# Patient Record
Sex: FEMALE | Race: BLACK OR AFRICAN AMERICAN | Employment: FULL TIME | URBAN - METROPOLITAN AREA
[De-identification: names, ages, dates, MRNs, and addresses within clinical notes are randomized per-mention and may not be internally consistent; named-entity substitution may affect disease eponyms.]

---

## 2022-12-05 ENCOUNTER — HOSPITAL ENCOUNTER (INPATIENT)
Age: 62
LOS: 2 days | Discharge: HOME | DRG: 390 | End: 2022-12-07
Attending: EMERGENCY MEDICINE | Admitting: HOSPITALIST
Payer: COMMERCIAL

## 2022-12-05 ENCOUNTER — APPOINTMENT (OUTPATIENT)
Dept: GENERAL RADIOLOGY | Age: 62
DRG: 390 | End: 2022-12-05
Payer: COMMERCIAL

## 2022-12-05 ENCOUNTER — APPOINTMENT (OUTPATIENT)
Dept: CT IMAGING | Age: 62
DRG: 390 | End: 2022-12-05
Payer: COMMERCIAL

## 2022-12-05 DIAGNOSIS — N30.00 ACUTE CYSTITIS WITHOUT HEMATURIA: ICD-10-CM

## 2022-12-05 DIAGNOSIS — R10.11 ABDOMINAL PAIN, RIGHT UPPER QUADRANT: Primary | ICD-10-CM

## 2022-12-05 PROBLEM — K56.7 ILEUS (HCC): Status: ACTIVE | Noted: 2022-12-05

## 2022-12-05 LAB
A/G RATIO: 1.2 (ref 1.1–2.2)
ALBUMIN SERPL-MCNC: 4.7 G/DL (ref 3.4–5)
ALP BLD-CCNC: 90 U/L (ref 40–129)
ALT SERPL-CCNC: <5 U/L (ref 10–40)
ANION GAP SERPL CALCULATED.3IONS-SCNC: 17 MMOL/L (ref 3–16)
AST SERPL-CCNC: 18 U/L (ref 15–37)
BACTERIA: ABNORMAL /HPF
BASOPHILS ABSOLUTE: 0 K/UL (ref 0–0.2)
BASOPHILS RELATIVE PERCENT: 0.4 %
BILIRUB SERPL-MCNC: 0.7 MG/DL (ref 0–1)
BILIRUBIN URINE: NEGATIVE
BLOOD, URINE: NEGATIVE
BUN BLDV-MCNC: 15 MG/DL (ref 7–20)
CALCIUM SERPL-MCNC: 10.7 MG/DL (ref 8.3–10.6)
CHLORIDE BLD-SCNC: 96 MMOL/L (ref 99–110)
CLARITY: ABNORMAL
CO2: 24 MMOL/L (ref 21–32)
COLOR: ABNORMAL
CREAT SERPL-MCNC: 0.7 MG/DL (ref 0.6–1.2)
EOSINOPHILS ABSOLUTE: 0 K/UL (ref 0–0.6)
EOSINOPHILS RELATIVE PERCENT: 0.2 %
EPITHELIAL CELLS, UA: ABNORMAL /HPF (ref 0–5)
GFR SERPL CREATININE-BSD FRML MDRD: >60 ML/MIN/{1.73_M2}
GLUCOSE BLD-MCNC: 134 MG/DL (ref 70–99)
GLUCOSE URINE: NEGATIVE MG/DL
HCT VFR BLD CALC: 45.4 % (ref 36–48)
HEMOGLOBIN: 15.5 G/DL (ref 12–16)
KETONES, URINE: 40 MG/DL
LEUKOCYTE ESTERASE, URINE: ABNORMAL
LIPASE: 13 U/L (ref 13–60)
LYMPHOCYTES ABSOLUTE: 1.1 K/UL (ref 1–5.1)
LYMPHOCYTES RELATIVE PERCENT: 11.5 %
MCH RBC QN AUTO: 27.6 PG (ref 26–34)
MCHC RBC AUTO-ENTMCNC: 34.2 G/DL (ref 31–36)
MCV RBC AUTO: 80.7 FL (ref 80–100)
MICROSCOPIC EXAMINATION: YES
MONOCYTES ABSOLUTE: 0.4 K/UL (ref 0–1.3)
MONOCYTES RELATIVE PERCENT: 4.2 %
NEUTROPHILS ABSOLUTE: 8.2 K/UL (ref 1.7–7.7)
NEUTROPHILS RELATIVE PERCENT: 83.7 %
NITRITE, URINE: NEGATIVE
PDW BLD-RTO: 14.3 % (ref 12.4–15.4)
PH UA: 7 (ref 5–8)
PLATELET # BLD: 364 K/UL (ref 135–450)
PMV BLD AUTO: 8.3 FL (ref 5–10.5)
POTASSIUM SERPL-SCNC: 3.7 MMOL/L (ref 3.5–5.1)
PROTEIN UA: ABNORMAL MG/DL
RAPID INFLUENZA  B AGN: NEGATIVE
RAPID INFLUENZA A AGN: NEGATIVE
RBC # BLD: 5.63 M/UL (ref 4–5.2)
RBC UA: ABNORMAL /HPF (ref 0–4)
SARS-COV-2, NAAT: NOT DETECTED
SODIUM BLD-SCNC: 137 MMOL/L (ref 136–145)
SPECIFIC GRAVITY UA: 1.02 (ref 1–1.03)
TOTAL PROTEIN: 8.6 G/DL (ref 6.4–8.2)
TROPONIN: <0.01 NG/ML
URINE REFLEX TO CULTURE: YES
URINE TYPE: ABNORMAL
UROBILINOGEN, URINE: 0.2 E.U./DL
WBC # BLD: 9.9 K/UL (ref 4–11)
WBC UA: ABNORMAL /HPF (ref 0–5)

## 2022-12-05 PROCEDURE — 2580000003 HC RX 258: Performed by: EMERGENCY MEDICINE

## 2022-12-05 PROCEDURE — 74177 CT ABD & PELVIS W/CONTRAST: CPT

## 2022-12-05 PROCEDURE — 85025 COMPLETE CBC W/AUTO DIFF WBC: CPT

## 2022-12-05 PROCEDURE — 84484 ASSAY OF TROPONIN QUANT: CPT

## 2022-12-05 PROCEDURE — 36415 COLL VENOUS BLD VENIPUNCTURE: CPT

## 2022-12-05 PROCEDURE — 6360000002 HC RX W HCPCS: Performed by: EMERGENCY MEDICINE

## 2022-12-05 PROCEDURE — 6360000004 HC RX CONTRAST MEDICATION: Performed by: EMERGENCY MEDICINE

## 2022-12-05 PROCEDURE — 80053 COMPREHEN METABOLIC PANEL: CPT

## 2022-12-05 PROCEDURE — 87086 URINE CULTURE/COLONY COUNT: CPT

## 2022-12-05 PROCEDURE — 87804 INFLUENZA ASSAY W/OPTIC: CPT

## 2022-12-05 PROCEDURE — 6360000002 HC RX W HCPCS: Performed by: HOSPITALIST

## 2022-12-05 PROCEDURE — 81001 URINALYSIS AUTO W/SCOPE: CPT

## 2022-12-05 PROCEDURE — 1200000000 HC SEMI PRIVATE

## 2022-12-05 PROCEDURE — 96375 TX/PRO/DX INJ NEW DRUG ADDON: CPT

## 2022-12-05 PROCEDURE — 87635 SARS-COV-2 COVID-19 AMP PRB: CPT

## 2022-12-05 PROCEDURE — 83690 ASSAY OF LIPASE: CPT

## 2022-12-05 PROCEDURE — 2580000003 HC RX 258: Performed by: HOSPITALIST

## 2022-12-05 PROCEDURE — 99285 EMERGENCY DEPT VISIT HI MDM: CPT

## 2022-12-05 PROCEDURE — 96374 THER/PROPH/DIAG INJ IV PUSH: CPT

## 2022-12-05 RX ORDER — SODIUM CHLORIDE 9 MG/ML
INJECTION, SOLUTION INTRAVENOUS CONTINUOUS
Status: DISCONTINUED | OUTPATIENT
Start: 2022-12-05 | End: 2022-12-07 | Stop reason: HOSPADM

## 2022-12-05 RX ORDER — ONDANSETRON 4 MG/1
4 TABLET, ORALLY DISINTEGRATING ORAL EVERY 8 HOURS PRN
Status: DISCONTINUED | OUTPATIENT
Start: 2022-12-05 | End: 2022-12-07 | Stop reason: HOSPADM

## 2022-12-05 RX ORDER — MORPHINE SULFATE 4 MG/ML
4 INJECTION, SOLUTION INTRAMUSCULAR; INTRAVENOUS ONCE
Status: COMPLETED | OUTPATIENT
Start: 2022-12-05 | End: 2022-12-05

## 2022-12-05 RX ORDER — ACETAMINOPHEN 325 MG/1
650 TABLET ORAL EVERY 6 HOURS PRN
Status: DISCONTINUED | OUTPATIENT
Start: 2022-12-05 | End: 2022-12-07 | Stop reason: HOSPADM

## 2022-12-05 RX ORDER — POLYETHYLENE GLYCOL 3350 17 G/17G
17 POWDER, FOR SOLUTION ORAL DAILY PRN
Status: DISCONTINUED | OUTPATIENT
Start: 2022-12-05 | End: 2022-12-07 | Stop reason: HOSPADM

## 2022-12-05 RX ORDER — SODIUM CHLORIDE 0.9 % (FLUSH) 0.9 %
10 SYRINGE (ML) INJECTION PRN
Status: DISCONTINUED | OUTPATIENT
Start: 2022-12-05 | End: 2022-12-07 | Stop reason: HOSPADM

## 2022-12-05 RX ORDER — ONDANSETRON 2 MG/ML
4 INJECTION INTRAMUSCULAR; INTRAVENOUS EVERY 6 HOURS PRN
Status: DISCONTINUED | OUTPATIENT
Start: 2022-12-05 | End: 2022-12-07 | Stop reason: HOSPADM

## 2022-12-05 RX ORDER — ENOXAPARIN SODIUM 100 MG/ML
40 INJECTION SUBCUTANEOUS DAILY
Status: DISCONTINUED | OUTPATIENT
Start: 2022-12-06 | End: 2022-12-07 | Stop reason: HOSPADM

## 2022-12-05 RX ORDER — 0.9 % SODIUM CHLORIDE 0.9 %
1000 INTRAVENOUS SOLUTION INTRAVENOUS ONCE
Status: COMPLETED | OUTPATIENT
Start: 2022-12-05 | End: 2022-12-05

## 2022-12-05 RX ORDER — SODIUM CHLORIDE 9 MG/ML
INJECTION, SOLUTION INTRAVENOUS PRN
Status: DISCONTINUED | OUTPATIENT
Start: 2022-12-05 | End: 2022-12-07 | Stop reason: HOSPADM

## 2022-12-05 RX ORDER — SODIUM CHLORIDE 0.9 % (FLUSH) 0.9 %
5-40 SYRINGE (ML) INJECTION EVERY 12 HOURS SCHEDULED
Status: DISCONTINUED | OUTPATIENT
Start: 2022-12-05 | End: 2022-12-07 | Stop reason: HOSPADM

## 2022-12-05 RX ORDER — POTASSIUM CHLORIDE 20 MEQ/1
40 TABLET, EXTENDED RELEASE ORAL PRN
Status: DISCONTINUED | OUTPATIENT
Start: 2022-12-05 | End: 2022-12-07 | Stop reason: HOSPADM

## 2022-12-05 RX ORDER — ACETAMINOPHEN 650 MG/1
650 SUPPOSITORY RECTAL EVERY 6 HOURS PRN
Status: DISCONTINUED | OUTPATIENT
Start: 2022-12-05 | End: 2022-12-07 | Stop reason: HOSPADM

## 2022-12-05 RX ORDER — ONDANSETRON 2 MG/ML
4 INJECTION INTRAMUSCULAR; INTRAVENOUS ONCE
Status: COMPLETED | OUTPATIENT
Start: 2022-12-05 | End: 2022-12-05

## 2022-12-05 RX ORDER — POTASSIUM CHLORIDE 7.45 MG/ML
10 INJECTION INTRAVENOUS PRN
Status: DISCONTINUED | OUTPATIENT
Start: 2022-12-05 | End: 2022-12-07 | Stop reason: HOSPADM

## 2022-12-05 RX ADMIN — MORPHINE SULFATE 4 MG: 4 INJECTION, SOLUTION INTRAMUSCULAR; INTRAVENOUS at 15:38

## 2022-12-05 RX ADMIN — IOPAMIDOL 100 ML: 755 INJECTION, SOLUTION INTRAVENOUS at 16:36

## 2022-12-05 RX ADMIN — Medication 10 ML: at 23:32

## 2022-12-05 RX ADMIN — PIPERACILLIN AND TAZOBACTAM 4500 MG: 4; .5 INJECTION, POWDER, FOR SOLUTION INTRAVENOUS at 23:34

## 2022-12-05 RX ADMIN — SODIUM CHLORIDE: 9 INJECTION, SOLUTION INTRAVENOUS at 23:33

## 2022-12-05 RX ADMIN — SODIUM CHLORIDE 1000 ML: 9 INJECTION, SOLUTION INTRAVENOUS at 15:38

## 2022-12-05 RX ADMIN — CEFTRIAXONE 1000 MG: 1 INJECTION, POWDER, FOR SOLUTION INTRAMUSCULAR; INTRAVENOUS at 19:07

## 2022-12-05 RX ADMIN — ONDANSETRON 4 MG: 2 INJECTION INTRAMUSCULAR; INTRAVENOUS at 15:38

## 2022-12-05 ASSESSMENT — ENCOUNTER SYMPTOMS
STRIDOR: 0
SHORTNESS OF BREATH: 0
COLOR CHANGE: 0
FACIAL SWELLING: 0
NAUSEA: 1
VOMITING: 1
TROUBLE SWALLOWING: 0
ABDOMINAL PAIN: 1
VOICE CHANGE: 0
WHEEZING: 0

## 2022-12-05 ASSESSMENT — PAIN SCALES - GENERAL
PAINLEVEL_OUTOF10: 1
PAINLEVEL_OUTOF10: 8

## 2022-12-05 ASSESSMENT — PAIN DESCRIPTION - PAIN TYPE: TYPE: ACUTE PAIN

## 2022-12-05 ASSESSMENT — PAIN - FUNCTIONAL ASSESSMENT
PAIN_FUNCTIONAL_ASSESSMENT: ACTIVITIES ARE NOT PREVENTED
PAIN_FUNCTIONAL_ASSESSMENT: 0-10

## 2022-12-05 ASSESSMENT — PAIN DESCRIPTION - ORIENTATION: ORIENTATION: ANTERIOR

## 2022-12-05 ASSESSMENT — PAIN DESCRIPTION - LOCATION: LOCATION: ABDOMEN

## 2022-12-05 ASSESSMENT — PAIN DESCRIPTION - FREQUENCY: FREQUENCY: INTERMITTENT

## 2022-12-05 ASSESSMENT — PAIN DESCRIPTION - DESCRIPTORS: DESCRIPTORS: DISCOMFORT;SHARP

## 2022-12-05 NOTE — LETTER
[address]  Phone: [phone]             December 7, 2022    Patient: Wilfrido Ferro   YOB: 1960   Date of Visit: 12/5/2022       To Whom It May Concern:    Wilfrido Ferro was seen and treated in our facility  beginning 12/5/2022 until 12/7/22. She may return to work on Monday 12/12/22.       Sincerely,       [facility]         Signature:__________________________________

## 2022-12-05 NOTE — LETTER
[address]  Phone: [phone]             December 7, 2022    Patient: Wilfrido Ferro   YOB: 1960   Date of Visit: 12/5/2022       To Whom It May Concern:    Wilfrido Ferro was seen and treated in our facility  beginning 12/5/2022 until 12/7/22. Patient's son, Fiordaliza Rodriguez was with the patient during this time and shall return to work on 12/8/22.       Sincerely,       Sandy Bence, RN         Signature:__________________________________

## 2022-12-05 NOTE — ED PROVIDER NOTES
[id number] [facility]  eMERGENCY dEPARTMENT eNCOUnter      Pt Name: Domitila Robertson  MRN: [id number]  [place] 1960  Date of evaluation: 12/5/2022  Provider: Mamie Perez MD    [address] [place] [address]       Chief Complaint   Patient presents with    Abdominal Pain     Burning in stomach since yesterday, with vomiting. HISTORY OF PRESENT ILLNESS   (Location/Symptom, Timing/Onset, Context/Setting, Quality, Duration, Modifying Factors, Severity)  Note limiting factors. Domitila Robertson is a 63 y.o. female who reports 1 day of epigastric abdominal pain nausea and vomiting. Patient has any fever diarrhea chest pain confusion altered mental status. Patient with symptoms are moderate constant and worsening. Patient denies any known aggravating or alleviating factors. HPI    Nursing Notes were reviewed. REVIEW OFSYSTEMS    (2-9 systems for level 4, 10 or more for level 5)     Review of Systems   Constitutional:  Negative for appetite change, fever and unexpected weight change. HENT:  Negative for facial swelling, trouble swallowing and voice change. Eyes:  Negative for visual disturbance. Respiratory:  Negative for shortness of breath, wheezing and stridor. Cardiovascular:  Negative for chest pain and palpitations. Gastrointestinal:  Positive for abdominal pain, nausea and vomiting. Genitourinary:  Negative for dysuria and vaginal bleeding. Musculoskeletal:  Negative for neck pain and neck stiffness. Skin:  Negative for color change and wound. Neurological:  Negative for seizures and syncope. Psychiatric/Behavioral:  Negative for self-injury and suicidal ideas. Except as noted above the remainder of the review of systems was reviewed and negative. PAST MEDICAL HISTORY   History reviewed. No pertinent past medical history. SURGICAL HISTORY     History reviewed. No pertinent surgical history.       CURRENT MEDICATIONS       Previous Medications    No this note:    CT ABDOMEN PELVIS W IV CONTRAST Additional Contrast? None   Preliminary Result   1. Dilation of small bowel loops in the abdomen and pelvis possibly related   to ileus or early bowel obstruction. 2. Gallbladder has an abnormal appearance with wall calcification and   increased density in the lumen. Further evaluation with gallbladder   ultrasound would be helpful. 3. Uterine fibroids as described. RECOMMENDATIONS:   Right upper quadrant gallbladder ultrasound. Surgical consult for small-bowel obstruction or ileus. ED BEDSIDE ULTRASOUND:   Performed by ED Physician - none    LABS:  Labs Reviewed   CBC WITH AUTO DIFFERENTIAL - Abnormal; Notable for the following components:       Result Value    RBC 5.63 (*)     Neutrophils Absolute 8.2 (*)     All other components within normal limits   COMPREHENSIVE METABOLIC PANEL - Abnormal; Notable for the following components:    Chloride 96 (*)     Anion Gap 17 (*)     Glucose 134 (*)     Calcium 10.7 (*)     Total Protein 8.6 (*)     ALT <5 (*)     All other components within normal limits   URINALYSIS WITH REFLEX TO CULTURE - Abnormal; Notable for the following components:    Color, UA DARK YELLOW (*)     Clarity, UA CLOUDY (*)     Ketones, Urine 40 (*)     Protein, UA TRACE (*)     Leukocyte Esterase, Urine SMALL (*)     All other components within normal limits   MICROSCOPIC URINALYSIS - Abnormal; Notable for the following components:    WBC, UA 10-20 (*)     Epithelial Cells, UA 11-20 (*)     Bacteria, UA 3+ (*)     All other components within normal limits   RAPID INFLUENZA A/B ANTIGENS   COVID-19, RAPID   CULTURE, URINE   LIPASE   TROPONIN       All otherlabs were within normal range or not returned as of this dictation.     EMERGENCY DEPARTMENT COURSE and DIFFERENTIAL DIAGNOSIS/MDM:   Vitals:    Vitals:    12/05/22 1434 12/05/22 1830   BP: (!) 158/93 132/76   Pulse: 87 86   Resp: 18 18   Temp: 98.3 °F (36.8 °C)    TempSrc: Oral SpO2: 98% 98%   Weight: 161 lb 6 oz (73.2 kg)          Is this patient to be included in the SEP-1 Core Measure due to severe sepsis or septic shock? No   Exclusion criteria - the patient is NOT to be included for SEP-1 Core Measure due to:  2+ SIRS criteria are not met      MDM  Number of Diagnoses or Management Options  Work-up is concerning for ileus versus possible early small bowel obstruction with the CT as well as gallbladder showing abnormal appearance with calcification and gallbladder ultrasound recommended. Patient also has bacteria in the urine history of ceftriaxone. I have spoken to the patient and her son and they are in agreement with admission to [facility] for likely right upper quadrant gallbladder ultrasound and GI or surgical consultation. Patient and son expressed understanding and agreement's plan. Patient does require IV pain medication but report substantial improvement in symptoms with IV pain medication was given. Patient admitted for further care. Procedures    FINAL IMPRESSION      1. Abdominal pain, right upper quadrant    2. Acute cystitis without hematuria          DISPOSITION/PLAN   DISPOSITION Admitted 12/05/2022 06:39:39 PM      (Please note that portions of this note were completed with a voice recognition program.  Efforts were made to edit the dictations but occasionally words aremis-transcribed. )    Bernadette Rascon MD (electronically signed)  Attending Emergency Physician           Bernadette Rascon MD  12/05/22 5918

## 2022-12-05 NOTE — LETTER
[address]  Phone: [phone]             December 7, 2022    Patient: Wilfrido Ferro   YOB: 1960   Date of Visit: 12/5/2022       To Whom It May Concern:    Wilfrido Ferro was seen and treated in our facility  beginning 12/5/2022 until 12/7/22. Her son, Mary Jane Mccray was with her during this time and will return to work 12/8/22.       Sincerely,       [facility] ([facility])         Signature:__________________________________

## 2022-12-06 ENCOUNTER — APPOINTMENT (OUTPATIENT)
Dept: ULTRASOUND IMAGING | Age: 62
DRG: 390 | End: 2022-12-06
Payer: COMMERCIAL

## 2022-12-06 LAB
ANION GAP SERPL CALCULATED.3IONS-SCNC: 10 MMOL/L (ref 3–16)
BASOPHILS ABSOLUTE: 0 K/UL (ref 0–0.2)
BASOPHILS RELATIVE PERCENT: 0.7 %
BUN BLDV-MCNC: 11 MG/DL (ref 7–20)
CALCIUM SERPL-MCNC: 8.4 MG/DL (ref 8.3–10.6)
CHLORIDE BLD-SCNC: 106 MMOL/L (ref 99–110)
CO2: 25 MMOL/L (ref 21–32)
CREAT SERPL-MCNC: 0.5 MG/DL (ref 0.6–1.2)
EOSINOPHILS ABSOLUTE: 0.3 K/UL (ref 0–0.6)
EOSINOPHILS RELATIVE PERCENT: 5.4 %
GFR SERPL CREATININE-BSD FRML MDRD: >60 ML/MIN/{1.73_M2}
GLUCOSE BLD-MCNC: 99 MG/DL (ref 70–99)
HCT VFR BLD CALC: 38.6 % (ref 36–48)
HEMOGLOBIN: 12.5 G/DL (ref 12–16)
LYMPHOCYTES ABSOLUTE: 1.5 K/UL (ref 1–5.1)
LYMPHOCYTES RELATIVE PERCENT: 26.6 %
MAGNESIUM: 2.1 MG/DL (ref 1.8–2.4)
MCH RBC QN AUTO: 27.3 PG (ref 26–34)
MCHC RBC AUTO-ENTMCNC: 32.3 G/DL (ref 31–36)
MCV RBC AUTO: 84.4 FL (ref 80–100)
MONOCYTES ABSOLUTE: 0.4 K/UL (ref 0–1.3)
MONOCYTES RELATIVE PERCENT: 7.9 %
NEUTROPHILS ABSOLUTE: 3.3 K/UL (ref 1.7–7.7)
NEUTROPHILS RELATIVE PERCENT: 59.4 %
PDW BLD-RTO: 14.4 % (ref 12.4–15.4)
PLATELET # BLD: 250 K/UL (ref 135–450)
PMV BLD AUTO: 8.3 FL (ref 5–10.5)
POTASSIUM REFLEX MAGNESIUM: 3.4 MMOL/L (ref 3.5–5.1)
RBC # BLD: 4.58 M/UL (ref 4–5.2)
SODIUM BLD-SCNC: 141 MMOL/L (ref 136–145)
URINE CULTURE, ROUTINE: NORMAL
WBC # BLD: 5.5 K/UL (ref 4–11)

## 2022-12-06 PROCEDURE — 6370000000 HC RX 637 (ALT 250 FOR IP): Performed by: HOSPITALIST

## 2022-12-06 PROCEDURE — 2580000003 HC RX 258: Performed by: HOSPITALIST

## 2022-12-06 PROCEDURE — 1200000000 HC SEMI PRIVATE

## 2022-12-06 PROCEDURE — 80048 BASIC METABOLIC PNL TOTAL CA: CPT

## 2022-12-06 PROCEDURE — 6360000002 HC RX W HCPCS: Performed by: HOSPITALIST

## 2022-12-06 PROCEDURE — 85025 COMPLETE CBC W/AUTO DIFF WBC: CPT

## 2022-12-06 PROCEDURE — 9990000010 HC NO CHARGE VISIT

## 2022-12-06 PROCEDURE — 83735 ASSAY OF MAGNESIUM: CPT

## 2022-12-06 PROCEDURE — 76705 ECHO EXAM OF ABDOMEN: CPT

## 2022-12-06 PROCEDURE — 36415 COLL VENOUS BLD VENIPUNCTURE: CPT

## 2022-12-06 RX ORDER — PANTOPRAZOLE SODIUM 40 MG/1
40 TABLET, DELAYED RELEASE ORAL
Status: DISCONTINUED | OUTPATIENT
Start: 2022-12-07 | End: 2022-12-07 | Stop reason: HOSPADM

## 2022-12-06 RX ADMIN — PIPERACILLIN AND TAZOBACTAM 3375 MG: 3; .375 INJECTION, POWDER, FOR SOLUTION INTRAVENOUS at 04:04

## 2022-12-06 RX ADMIN — PIPERACILLIN AND TAZOBACTAM 3375 MG: 3; .375 INJECTION, POWDER, FOR SOLUTION INTRAVENOUS at 12:39

## 2022-12-06 RX ADMIN — PIPERACILLIN AND TAZOBACTAM 3375 MG: 3; .375 INJECTION, POWDER, FOR SOLUTION INTRAVENOUS at 20:22

## 2022-12-06 RX ADMIN — ENOXAPARIN SODIUM 40 MG: 100 INJECTION SUBCUTANEOUS at 08:43

## 2022-12-06 RX ADMIN — POTASSIUM BICARBONATE 40 MEQ: 782 TABLET, EFFERVESCENT ORAL at 16:01

## 2022-12-06 RX ADMIN — Medication 10 ML: at 08:45

## 2022-12-06 ASSESSMENT — PAIN SCALES - GENERAL
PAINLEVEL_OUTOF10: 0

## 2022-12-06 NOTE — ED NOTES
Per access center:    Patient to be admitted to: Room 4252 at [facility] to RN report to be called to: [phone]    Transport Information:   Service: [facility]  ETA: [phone]     Primary RN notified.      Leigh Dunham RN  12/05/22 2032       Leigh Dunham RN  12/05/22 2033

## 2022-12-06 NOTE — CONSULTS
GASTROENTEROLOGY INPATIENT CONSULTATION        IDENTIFYING DATA/REASON FOR CONSULTATION   PATIENT:  Davion Mendoza  MRN:  [id number]  ADMIT DATE: 12/5/2022  TIME OF EVALUATION: 12/6/2022 12:27 PM  HOSPITAL STAY:   LOS: 1 day     REASON FOR CONSULTATION:  abdominal pain    HISTORY OF PRESENT ILLNESS   Wilfrido Ferro is a 63 y.o. female who presented on 12/5/2022 with abdominal pain and nausea. She speaks [place]. iPad  used to obtain history. Patient reports pain started Saturday. Pain located mid abdomen severe and achy in nature. She had associated nausea. She stuck her finger down her throat to induce vomiting. Denies blood in vomit. She denies any change in her bowel pattern, diarrhea or constipation. Last bowel movement was yesterday. Denies blood in stool. She had a prior EGD and [place] She reports EGD noted gastritis. She denies any recent sick contacts. She denies any prior abdominal surgeries. CT A/P showed dilation of small bowel loops, abnormal gallbladder with wall calcifications and increased density in the lumen, and uterine fibroids    Right upper quadrant ultrasound showed cholelithiasis, no gallbladder wall thickening or pericholecystic fluid, fatty infiltration of the liver, no intrahepatic or extrahepatic biliary ductal dilation    LFTs and lipase normal  White count normal        PAST MEDICAL, SURGICAL, FAMILY, and SOCIAL HISTORY   History reviewed. No pertinent past medical history. History reviewed. No pertinent surgical history. History reviewed. No pertinent family history.   Social History     Socioeconomic History    Marital status: Single     Spouse name: None    Number of children: None    Years of education: None    Highest education level: None   Tobacco Use    Smoking status: Never    Smokeless tobacco: Never   Substance and Sexual Activity    Alcohol use: Never    Drug use: Never       MEDICATIONS   SCHEDULED: sodium chloride flush, 5-40 mL, 2 times per day  enoxaparin, 40 mg, Daily  piperacillin-tazobactam, 3,375 mg, Q8H      FLUIDS/DRIPS:     sodium chloride Stopped (12/06/22 0404)    sodium chloride       PRNs: sodium chloride flush, 10 mL, PRN  sodium chloride, , PRN  potassium chloride, 40 mEq, PRN   Or  potassium alternative oral replacement, 40 mEq, PRN   Or  potassium chloride, 10 mEq, PRN  ondansetron, 4 mg, Q8H PRN   Or  ondansetron, 4 mg, Q6H PRN  polyethylene glycol, 17 g, Daily PRN  acetaminophen, 650 mg, Q6H PRN   Or  acetaminophen, 650 mg, Q6H PRN      ALLERGIES:  She No Known Allergies    REVIEW OF SYSTEMS   Pertinent ROS noted in HPI    PHYSICAL EXAM     Vitals:    12/06/22 0449 12/06/22 0541 12/06/22 0823 12/06/22 1210   BP: 108/67  114/74 120/60   Pulse: 77  74 75   Resp:   16 18   Temp: 98.1 °F (36.7 °C)  98.1 °F (36.7 °C) 98.3 °F (36.8 °C)   TempSrc: Oral  Oral Oral   SpO2: 97%  97% 97%   Weight:  161 lb 6 oz (73.2 kg)     Height:           I/O last 3 completed shifts: In: 1250 [P.O.:200; IV Piggyback:1050]  Out: -       Physical Exam:  General appearance: alert, cooperative, no distress, appears stated age  Eyes: Anicteric  Head: Normocephalic, without obvious abnormality  Lungs: clear to auscultation bilaterally, Normal Effort  Heart: regular rate and rhythm, normal S1 and S2, no murmurs or rubs  Abdomen: soft, non-distended, non-tender. Bowel sounds normal. No masses,  no organomegaly.    Extremities: atraumatic, no cyanosis or edema  Skin: warm and dry, no jaundice  Neuro: Grossly intact, A&OX3      LABS AND IMAGING   Laboratory   Recent Labs     12/05/22  1525 12/06/22  0557   WBC 9.9 5.5   HGB 15.5 12.5   HCT 45.4 38.6   MCV 80.7 84.4    250     Recent Labs     12/05/22  1525 12/06/22  0556    141   K 3.7 3.4*   CL 96* 106   CO2 24 25   BUN 15 11   CREATININE 0.7 0.5*     Recent Labs     12/05/22  1525   AST 18   ALT <5*   BILITOT 0.7   ALKPHOS 90     Recent Labs     12/05/22  1525 LIPASE 13.0     No results for input(s): PROTIME, INR in the last 72 hours. Imaging  US GALLBLADDER RUQ   Final Result   1. Fatty infiltration of the liver. 2. Cholelithiasis. CT ABDOMEN PELVIS W IV CONTRAST Additional Contrast? None   Final Result   1. Dilation of small bowel loops in the abdomen and pelvis possibly related   to ileus or early bowel obstruction. 2. Gallbladder has an abnormal appearance with wall calcification and   increased density in the lumen. Further evaluation with gallbladder   ultrasound would be helpful. 3. Uterine fibroids as described. RECOMMENDATIONS:   Right upper quadrant gallbladder ultrasound. Surgical consult for small-bowel obstruction or ileus. ASSESSMENT AND RECOMMENDATIONS   63 y.o. female who presented 12/5/2022 with abdominal pain    IMPRESSION/RECOMMENDATIONS:  Acute abdominal pain, nausea possibly related to ileus, acute gastroenteritis. CT with small bowel dilation suggestive of ileus, less likely obstruction. Patient having bowel movements and passing flatus. Abd soft, nondistended. No prior abdominal surgeries. Right upper quadrant ultrasound showed gallstones, negative for cholecystitis. LFTs and lipase normal.  Supportive care with antiemetics as needed. Will add PPI. This case with Dr. Sinan Alonso. Please await his further input and recommendations          If you have any questions or need any further information, please feel free to contact our consult team.  Thank you for allowing us to participate in the care of Wilfrido Ferro. The note was completed using Dragon voice recognition transcription. Every effort was made to ensure accuracy; however, inadvertent transcription errors may be present despite my best efforts to edit errors.       Minesh Sierra PA-C

## 2022-12-06 NOTE — H&P
Hospital Medicine History & Physical      PCP: No primary care provider on file. Date of Admission: 12/5/2022    Date of Service: Pt seen/examined on 12/5/2022 and Admitted to Inpatient with expected LOS greater than two midnights due to medical therapy. Chief Complaint:  burning abdominal pain with vomiting      History Of Present Illness:      63 y.o. female with no significant PMHx presented to [facility] in transfer from [facility] for treatment of ileus vs sbo and abnormal appearing gall bladder on CT. Pt presented to [facility] ED with abdominal pain and vomiting which started yesterday. No longer vomiting but still has nausea. Starting to get appetite back. Normal bowel movement yesterday. No abdominal pain at this time    Past Medical History:      History reviewed. No pertinent past medical history. Past Surgical History:      History reviewed. No pertinent surgical history. Medications Prior to Admission:      No medications      Allergies:  Patient has no known allergies. Social History:      The patient currently lives home with family    TOBACCO:   reports that she has never smoked. She has never used smokeless tobacco.  ETOH:   reports no history of alcohol use. Family History:      Reviewed in detail positive as follows:    History reviewed. No pertinent family history. REVIEW OF SYSTEMS:   Pertinent positives as noted in the HPI. All other systems reviewed and negative. PHYSICAL EXAM PERFORMED:    /85   Pulse 79   Temp 98.3 °F (36.8 °C) (Oral)   Resp 18   Wt 161 lb 6 oz (73.2 kg)   SpO2 96%     General appearance:  Well developed, well nourished, female lying on hospital bed in no apparent distress, appears stated age and cooperative. HEENT:  Normal cephalic, atraumatic without obvious deformity. Pupils equal, round, and reactive to light. Conjunctivae/corneas clear. Neck: Supple, with full range of motion.  No jugular venous distention. Trachea midline. Respiratory:  Normal respiratory effort. Clear to auscultation, bilaterally without accessory muscle use. Cardiovascular:  Regular rate and rhythm without murmurs, no lower extremity edema. Abdomen: Soft, non-tender, (tenderness was to epigastrium earlier today) non-distended, without rebound or guarding. Normal bowel sounds. Musculoskeletal:  Moves all extremities equally. Full range of motion without deformity. Skin: Skin warm, dry and intact. No rashes or lesions. Neurologic:  Neurovascularly intact without any focal sensory/motor deficits. Cranial nerves: II-XII intact, grossly non-focal.  Psychiatric:  Alert and oriented, thought content appropriate, normal insight  Capillary Refill: Brisk,< 3 seconds   Peripheral Pulses: +2 palpable, equal bilaterally       Labs:     Recent Labs     12/05/22  1525   WBC 9.9   HGB 15.5   HCT 45.4        Recent Labs     12/05/22  1525      K 3.7   CL 96*   CO2 24   BUN 15   CREATININE 0.7   CALCIUM 10.7*     Recent Labs     12/05/22  1525   AST 18   ALT <5*   BILITOT 0.7   ALKPHOS 90     No results for input(s): INR in the last 72 hours. Recent Labs     12/05/22  1525   TROPONINI <0.01       Urinalysis:      Lab Results   Component Value Date/Time    NITRU Negative 12/05/2022 03:25 PM    WBCUA 10-20 12/05/2022 03:25 PM    BACTERIA 3+ 12/05/2022 03:25 PM    RBCUA None seen 12/05/2022 03:25 PM    BLOODU Negative 12/05/2022 03:25 PM    SPECGRAV 1.020 12/05/2022 03:25 PM    GLUCOSEU Negative 12/05/2022 03:25 PM       Radiology:     CT ABDOMEN PELVIS W IV CONTRAST Additional Contrast? None   Final Result   1. Dilation of small bowel loops in the abdomen and pelvis possibly related   to ileus or early bowel obstruction. 2. Gallbladder has an abnormal appearance with wall calcification and   increased density in the lumen. Further evaluation with gallbladder   ultrasound would be helpful. 3. Uterine fibroids as described. RECOMMENDATIONS:   Right upper quadrant gallbladder ultrasound. Surgical consult for small-bowel obstruction or ileus. US GALLBLADDER RUQ    (Results Pending)       ASSESSMENT:    Active Hospital Problems    Diagnosis Date Noted    Ileus ([facility] [address] [K56.7] 12/05/2022     Priority: Medium         PLAN:    Small Bowel Obstruction vs Ileus   - CT findings: dilation of small bowel loops in the abdomen and pelvis possibly related to ileus or early bowel obstruction  - Not currently vomiting, will hold off on NG tube for now  - IV fluids  - General Surgery consulted and will manage    Abnormal gall bladder on CT imaging  - CT abd/pel: gallbladder has abnormal appearance with wall calcification and increased density in the lumen. Recommend GB u/s  - zosyn  - IV fluids  - npo after [place] for ultrasound    Bacteriuria  - small leukocytes, 3 + bacteria with epithelial cells  - likely contaminate, will culture  - rocephin given in ED, zosyn ordered for poss Gallbladder infection, no additional abx until culture is resulted    DVT Prophylaxis: Lovenox  Diet: ADULT DIET; Regular  Code Status: Full Code    Dispo - Inpatient       [address], APRN - CNP    Thank you No primary care provider on file. for the opportunity to be involved in this patient's care. If you have any questions or concerns please feel free to contact me at [phone].

## 2022-12-06 NOTE — FLOWSHEET NOTE
63 y.o female admitted to #[id number] under care of Dr. Berna Rausch for abdominal pain and acute cystitis. Pt arrived with her son \"Escobar\" whom is bilingual and I explained the plan of care and orders and room and call light usage to him and the patient. Son left and pt's admission history completed with [facility]  Steven. Call light at fingertips. No skin issues. Pt reported pain 1/10 and tolerable to 4/10.

## 2022-12-06 NOTE — FLOWSHEET NOTE
Pt transported to Ultrasound. Pt son here at the bedside. Pt has had and uneventful night with no elevated c/o's of pain and no nause and or vomiting .

## 2022-12-06 NOTE — PROGRESS NOTES
Hospitalist Progress Note  12/6/2022 8:54 AM    PCP: No primary care provider on file. [phone]     Date of Admission: 12/5/2022                                                                                                                     HOSPITAL COURSE    Patient demographics:  The patient  Ayah Garza is a 63 y.o. female     Significant past medical history:   Patient Active Problem List   Diagnosis    Ileus ([facility]         Presenting symptoms:  burning abdominal pain with vomiting    Diagnostic workup:      CONSULTS DURING ADMISSION :   IP CONSULT TO GI      Patient was diagnosed with:  Small Bowel Obstruction vs Ileus   Abnormal gall bladder on CT imaging  Bacteriuria      Treatment while inpatient:  Pt presented to [facility] in transfer from [facility] for treatment of ileus vs sbo and abnormal appearing gall bladder on CT.                                                                                       ----------------------------------------------------------      SUBJECTIVE COMPLAINTS- follow up for abdominal pain with vomiting    Diet: Diet NPO Exceptions are: Ice Chips      OBJECTIVE:   Patient Active Problem List   Diagnosis    Ileus ([facility]       Allergies  Patient has no known allergies.     Medications    Scheduled Meds:   sodium chloride flush  5-40 mL IntraVENous 2 times per day    enoxaparin  40 mg SubCUTAneous Daily    piperacillin-tazobactam  3,375 mg IntraVENous Q8H     Continuous Infusions:   sodium chloride Stopped (12/06/22 0404)    sodium chloride       PRN Meds:  sodium chloride flush, sodium chloride, potassium chloride **OR** potassium alternative oral replacement **OR** potassium chloride, ondansetron **OR** ondansetron, polyethylene glycol, acetaminophen **OR** acetaminophen    Vitals   Vitals /wt Patient Vitals for the past 8 hrs:   BP Temp Temp src Pulse Resp SpO2 Weight   12/06/22 0823 114/74 98.1 °F (36.7 °C) Oral 74 16 97 % --   12/06/22 0541 -- -- -- -- -- -- 161 lb 6 oz (73.2 kg)   12/06/22 0449 108/67 98.1 °F (36.7 °C) Oral 77 -- 97 % --        72HR INTAKE/OUTPUT:    Intake/Output Summary (Last 24 hours) at 12/6/2022 0854  Last data filed at 12/6/2022 0752  Gross per 24 hour   Intake 1787.15 ml   Output --   Net 1787.15 ml       Exam:    Gen:   Alert and oriented ×3    Eyes: PERRL. No sclera icterus. No conjunctival injection. ENT: No discharge. Pharynx clear. External appearance of ears and nose normal.  Neck: Trachea midline. No obvious mass. Resp: No accessory muscle use. No crackles. No wheezes. No rhonchi. CV: Regular rate. Regular rhythm. No murmur or rub. No edema. GI: Non-tender. Non-distended. No hernia. Skin: Warm, dry, normal texture and turgor. Lymph: No cervical LAD. No supraclavicular LAD. M/S: / Ext. No cyanosis. No clubbing. No joint deformity. Neuro: CN 2-12 are intact,  no neurologic deficits noted. PT/INR: No results for input(s): PROTIME, INR in the last 72 hours. APTT: No results for input(s): APTT in the last 72 hours. CBC:   Recent Labs     12/05/22  1525 12/06/22  0557   WBC 9.9 5.5   HGB 15.5 12.5   HCT 45.4 38.6   MCV 80.7 84.4    250       BMP:   Recent Labs     12/05/22  1525 12/06/22  0556    141   K 3.7 3.4*   CL 96* 106   CO2 24 25   BUN 15 11   CREATININE 0.7 0.5*       LIVER PROFILE:   Recent Labs     12/05/22  1525   ALKPHOS 90   AST 18   ALT <5*   BILITOT 0.7     No results for input(s): AMYLASE in the last 72 hours. Recent Labs     12/05/22  1525   LIPASE 13.0       UA:  Recent Labs     12/05/22  1525   WBCUA 10-20*   RBCUA None seen       TROPONIN:   Recent Labs     12/05/22  1525   TROPONINI <0.01       Lab Results   Component Value Date/Time    URRFLXCULT Yes 12/05/2022 03:25 PM       No results for input(s): TSHREFLEX in the last 72 hours. No components found for: [id number]  POC GLUCOSE:  No results for input(s): POCGLU in the last 72 hours.   No results for input(s): LABA1C in the last 72 hours. No results found for: LABA1C      ASSESSMENT AND PLAN     Small Bowel Obstruction vs Ileus   - CT findings: dilation of small bowel loops in the abdomen and pelvis possibly related to ileus   - IV fluids  GI consult is appreciated  Advance diet as tolerated     Abnormal gall bladder on CT imaging  - CT abd/pel: gallbladder has abnormal appearance with wall calcification and increased density in the lumen. Gallbladder ultrasound does not show cholecystitis  Check procalcitonin  -Continue Zosyn for now    Bacteriuria  - small leukocytes, 3 + bacteria with epithelial cells  - likely contaminate, will culture  - rocephin given in ED, zosyn ordered for poss Gallbladder infection, no additional abx until culture is resulted        Code Status: Full Code        Dispo - cc        The patient and / or the family were informed of the results of any tests, a time was given to answer questions, a plan was proposed and they agreed with plan. Brett Araujo MD    This note was transcribed using [id number]. Please disregard any translational errors.

## 2022-12-06 NOTE — PLAN OF CARE
Problem: Discharge Planning  Goal: Discharge to home or other facility with appropriate resources  12/6/2022 1204 by Rodney Arnold RN  Outcome: Progressing  12/6/2022 0021 by Jim Lanza RN  Outcome: Progressing  Flowsheets (Taken 12/5/2022 2256)  Discharge to home or other facility with appropriate resources: Identify barriers to discharge with patient and caregiver     Problem: Pain  Goal: Verbalizes/displays adequate comfort level or baseline comfort level  12/6/2022 1204 by Rodney Arnold RN  Outcome: Progressing  12/6/2022 0021 by Jim Lanza RN  Outcome: Progressing  Flowsheets (Taken 12/5/2022 2145)  Verbalizes/displays adequate comfort level or baseline comfort level: Encourage patient to monitor pain and request assistance

## 2022-12-06 NOTE — FLOWSHEET NOTE
4 Eyes Skin Assessment     NAME:  Wilfrido Ferro  YOB: 1960  MEDICAL RECORD NUMBER:  [id number]    The patient is being assessed for  Admission    I agree that One RN have performed a thorough Head to Toe Skin Assessment on the patient. ALL assessment sites listed below have been assessed. Areas assessed by both nurses:    Head, Face, Ears, Shoulders, Back, Chest, Arms, Elbows, Hands, Sacrum. Buttock, Coccyx, Ischium, and Legs. Feet and Heels        Does the Patient have a Wound?  No noted wound(s)       Corwin Prevention initiated by RN: No   Wound Care Orders initiated by RN: NA    Pressure Injury (Stage 3,4, Unstageable, DTI, NWPT, and Complex wounds) if present place referral order by RN under : NA    New and Established Ostomies, if present place, referral order under : NA      Nurse 1 eSignature: Electronically signed by Anjel Rae RN on 12/5/22 at 9:43 PM EST    **SHARE this note so that the co-signing nurse is able to place an eSignature**    Nurse 2 eSignature: Electronically signed by Jossie Ellsworth RN on 12/5/22 at 9:44 PM EST

## 2022-12-06 NOTE — PROGRESS NOTES
Occupational Therapy Attempt/Discharge  Wilfrido Ferro    OT order noted. Per RN, pt is UAL in the room and has no therapy needs. Will defer eval d/t independence.     Electronically signed by Moni Smith OT on 12/6/22 at 8:41 AM EST

## 2022-12-07 ENCOUNTER — APPOINTMENT (OUTPATIENT)
Dept: GENERAL RADIOLOGY | Age: 62
DRG: 390 | End: 2022-12-07
Payer: COMMERCIAL

## 2022-12-07 VITALS
HEIGHT: 66 IN | TEMPERATURE: 98.1 F | RESPIRATION RATE: 17 BRPM | WEIGHT: 161.38 LBS | DIASTOLIC BLOOD PRESSURE: 70 MMHG | OXYGEN SATURATION: 99 % | SYSTOLIC BLOOD PRESSURE: 121 MMHG | BODY MASS INDEX: 25.94 KG/M2 | HEART RATE: 63 BPM

## 2022-12-07 LAB
ANION GAP SERPL CALCULATED.3IONS-SCNC: 11 MMOL/L (ref 3–16)
BUN BLDV-MCNC: 9 MG/DL (ref 7–20)
CALCIUM SERPL-MCNC: 8.5 MG/DL (ref 8.3–10.6)
CHLORIDE BLD-SCNC: 105 MMOL/L (ref 99–110)
CO2: 25 MMOL/L (ref 21–32)
CREAT SERPL-MCNC: 0.6 MG/DL (ref 0.6–1.2)
GFR SERPL CREATININE-BSD FRML MDRD: >60 ML/MIN/{1.73_M2}
GLUCOSE BLD-MCNC: 99 MG/DL (ref 70–99)
HCT VFR BLD CALC: 38.6 % (ref 36–48)
HEMOGLOBIN: 12.8 G/DL (ref 12–16)
MCH RBC QN AUTO: 27.7 PG (ref 26–34)
MCHC RBC AUTO-ENTMCNC: 33 G/DL (ref 31–36)
MCV RBC AUTO: 83.8 FL (ref 80–100)
PDW BLD-RTO: 14.3 % (ref 12.4–15.4)
PLATELET # BLD: 262 K/UL (ref 135–450)
PMV BLD AUTO: 8.2 FL (ref 5–10.5)
POTASSIUM SERPL-SCNC: 3.7 MMOL/L (ref 3.5–5.1)
PROCALCITONIN: 0.05 NG/ML (ref 0–0.15)
RBC # BLD: 4.61 M/UL (ref 4–5.2)
SODIUM BLD-SCNC: 141 MMOL/L (ref 136–145)
WBC # BLD: 3.9 K/UL (ref 4–11)

## 2022-12-07 PROCEDURE — 2580000003 HC RX 258: Performed by: HOSPITALIST

## 2022-12-07 PROCEDURE — 85027 COMPLETE CBC AUTOMATED: CPT

## 2022-12-07 PROCEDURE — 36415 COLL VENOUS BLD VENIPUNCTURE: CPT

## 2022-12-07 PROCEDURE — 74018 RADEX ABDOMEN 1 VIEW: CPT

## 2022-12-07 PROCEDURE — 6370000000 HC RX 637 (ALT 250 FOR IP): Performed by: PHYSICIAN ASSISTANT

## 2022-12-07 PROCEDURE — 6360000002 HC RX W HCPCS: Performed by: HOSPITALIST

## 2022-12-07 PROCEDURE — 94760 N-INVAS EAR/PLS OXIMETRY 1: CPT

## 2022-12-07 PROCEDURE — 80048 BASIC METABOLIC PNL TOTAL CA: CPT

## 2022-12-07 PROCEDURE — 84145 PROCALCITONIN (PCT): CPT

## 2022-12-07 RX ORDER — PANTOPRAZOLE SODIUM 40 MG/1
40 TABLET, DELAYED RELEASE ORAL
Qty: 30 TABLET | Refills: 0 | Status: SHIPPED | OUTPATIENT
Start: 2022-12-08

## 2022-12-07 RX ADMIN — SODIUM CHLORIDE: 9 INJECTION, SOLUTION INTRAVENOUS at 04:14

## 2022-12-07 RX ADMIN — ENOXAPARIN SODIUM 40 MG: 100 INJECTION SUBCUTANEOUS at 09:14

## 2022-12-07 RX ADMIN — PIPERACILLIN AND TAZOBACTAM 3375 MG: 3; .375 INJECTION, POWDER, FOR SOLUTION INTRAVENOUS at 12:30

## 2022-12-07 RX ADMIN — PIPERACILLIN AND TAZOBACTAM 3375 MG: 3; .375 INJECTION, POWDER, FOR SOLUTION INTRAVENOUS at 04:15

## 2022-12-07 RX ADMIN — PANTOPRAZOLE SODIUM 40 MG: 40 TABLET, DELAYED RELEASE ORAL at 06:39

## 2022-12-07 ASSESSMENT — PAIN SCALES - GENERAL
PAINLEVEL_OUTOF10: 0
PAINLEVEL_OUTOF10: 0

## 2022-12-07 NOTE — PROGRESS NOTES
Pt provided d/c instructions using . All questions answered. IV removed. Pt walked down to car by this RN, where her son picked her up.  Electronically signed by Sherrie Dias RN on 12/7/2022 at 4:16 PM

## 2022-12-07 NOTE — CARE COORDINATION
Met with pt after dc order placed; she called son on the phone & had conversation with them both. He is on his way in to take her home, verified no needs.     CASE MANAGEMENT DISCHARGE SUMMARY:    DISCHARGE DATE: 12/07/22    DISCHARGED TO HOME     TRANSPORTATION: son             TIME: [id number]          Electronically signed by Brittany Perez RN on 12/7/2022 at 2:39 PM

## 2022-12-07 NOTE — PLAN OF CARE
Problem: Discharge Planning  Goal: Discharge to home or other facility with appropriate resources  12/7/2022 1136 by Ananda Marx RN  Outcome: Progressing  12/7/2022 0248 by Tracy Torres RN  Outcome: Progressing     Problem: Pain  Goal: Verbalizes/displays adequate comfort level or baseline comfort level  12/7/2022 1136 by Ananda Marx RN  Outcome: Progressing  12/7/2022 0248 by Tracy Torres RN  Outcome: Progressing

## 2022-12-07 NOTE — PROGRESS NOTES
Gastroenterology Progress Note    Wilfrido Perez is a 63 y.o. female patient. Principal Problem:    Ileus ([address]  Resolved Problems:    * No resolved hospital problems. *      SUBJECTIVE:  She is feeling better. No nausea or vomiting. Tolerating full liquid diet and hungry for more. No fevers. No abdominal pain. Current Facility-Administered Medications: [START ON 2022] pantoprazole (PROTONIX) tablet 40 mg, 40 mg, Oral, QAM AC  0.9 % sodium chloride infusion, , IntraVENous, Continuous  sodium chloride flush 0.9 % injection 5-40 mL, 5-40 mL, IntraVENous, 2 times per day  sodium chloride flush 0.9 % injection 10 mL, 10 mL, IntraVENous, PRN  0.9 % sodium chloride infusion, , IntraVENous, PRN  potassium chloride (KLOR-CON M) extended release tablet 40 mEq, 40 mEq, Oral, PRN **OR** potassium bicarb-citric acid (EFFER-K) effervescent tablet 40 mEq, 40 mEq, Oral, PRN **OR** potassium chloride 10 mEq/100 mL IVPB (Peripheral Line), 10 mEq, IntraVENous, PRN  ondansetron (ZOFRAN-ODT) disintegrating tablet 4 mg, 4 mg, Oral, Q8H PRN **OR** ondansetron (ZOFRAN) injection 4 mg, 4 mg, IntraVENous, Q6H PRN  polyethylene glycol (GLYCOLAX) packet 17 g, 17 g, Oral, Daily PRN  acetaminophen (TYLENOL) tablet 650 mg, 650 mg, Oral, Q6H PRN **OR** acetaminophen (TYLENOL) suppository 650 mg, 650 mg, Rectal, Q6H PRN  enoxaparin (LOVENOX) injection 40 mg, 40 mg, SubCUTAneous, Daily  [COMPLETED] piperacillin-tazobactam (ZOSYN) 4,500 mg in dextrose 5 % 100 mL IVPB (mini-bag), 4,500 mg, IntraVENous, Once **FOLLOWED BY** piperacillin-tazobactam (ZOSYN) 3,375 mg in dextrose 5 % 50 mL IVPB (mini-bag), 3,375 mg, IntraVENous, Q8H    Physical    VITALS:  /70   Pulse 73   Temp 98.2 °F (36.8 °C) (Oral)   Resp 17   Ht 5' 6\" (1.676 m)   Wt 161 lb 6 oz (73.2 kg)   SpO2 96%   BMI 26.05 kg/m²   TEMPERATURE:  Current - Temp: 98.2 °F (36.8 °C);  Max - Temp  Av.2 °F (36.8 °C)  Min: 98.1 °F (36.7 °C)  Max: 98.3 °F (36.8 °C)    NAD  Eyes: No icterus  RRR  Lungs CTA Bilaterally, normal effort  Abdomen soft, ND, NT, Bowel sounds normal.  Ext: no edema  Neuro: No tremor  Psych: A&Ox3    Data    Data Review:    Recent Labs     12/05/22  1525 12/06/22  0557   WBC 9.9 5.5   HGB 15.5 12.5   HCT 45.4 38.6   MCV 80.7 84.4    250     Recent Labs     12/05/22  1525 12/06/22  0556    141   K 3.7 3.4*   CL 96* 106   CO2 24 25   BUN 15 11   CREATININE 0.7 0.5*     Recent Labs     12/05/22  1525   AST 18   ALT <5*   BILITOT 0.7   ALKPHOS 90     Recent Labs     12/05/22  1525   LIPASE 13.0     No results for input(s): PROTIME, INR in the last 72 hours. No results for input(s): PTT in the last 72 hours. ASSESSMENT:  63 y.o. female who presented on 12/5/2022 with abdominal pain and nausea that started 3 days prior to admission in mid abdomen, severe, achy, associated with nausea and vomiting. Last BM yesterday. No blood or melena. [place]. No sick contacts. She denies any prior abdominal surgeries. CT A/P showed dilation of small bowel loops, abnormal gallbladder with wall calcifications and increased density in the lumen, and uterine fibroids. Right upper quadrant ultrasound showed cholelithiasis, no gallbladder wall thickening or pericholecystic fluid, fatty infiltration of the liver, no intrahepatic or extrahepatic biliary ductal dilation. LFTs and lipase normal  White count normal     Imp:    Small bowel ileus:  Etiology unclear. Given acuity of onset and lack of instigating factors, ?partial SBO vs. Viral gastroenteritis? Had a BM Monday. No home pain meds. No diabetes. Gallstones: Asymptomatic     Plan:   Repeat KUB today on my review of images shows improvement of the ileus. Her symptoms are resolved  Advance diet. OK for discharge if tolerates diet. Awaiting final read on KUB.     Thank you for allowing me to participate in the care of your patient. Please feel free to contact me with any concerns.   [address], [place]

## 2022-12-16 NOTE — DISCHARGE SUMMARY
Hospital Medicine Discharge Summary      Patient ID: Rachel Rodarte [id number]     Patient's PCP: No primary care provider on file. Admit Date: 12/5/2022     Discharge Date: 12/7/2022      Admitting Physician: Seth Virgen MD    Discharge Physician: Darrian Villalpando MD     Discharge Diagnoses: Active Hospital Problems    Diagnosis Date Noted    Ileus [facility]) [K56.7] 12/05/2022     Priority: Medium         The patient was seen and examined on the day of discharge and this discharge summary is in conjunction with any daily progress note from day of discharge. HOSPITAL COURSE       Patient demographics:  The patient  Wilfrido Ferro is a 63 y.o. female      Significant past medical history:       Patient Active Problem List   Diagnosis    Ileus ([address]            Presenting symptoms:  burning abdominal pain with vomiting     Diagnostic workup:   XR ABDOMEN   US GALLBLADDER RUQ  CT ABDOMEN PELVIS W IV CONTRAST        CONSULTS DURING ADMISSION :   IP CONSULT TO GI        Patient was diagnosed with:  Small Bowel Obstruction vs Ileus   Abnormal gall bladder on CT imaging  Bacteriuria        Treatment while inpatient:  63years old female who presented to the emergency room with abdominal pain nausea for 3 days. On CT scan of the abdomen and pelvis patient was       Suspected for cholecystitis and ileus. Patient was started on antibiotics. GI was consulted and patient underwent right upper quadrant ultrasound which was not consistent with cholecystitis. Patient started having bowel movements and she was able to eat regular diet without any discomfort. Patient does have cholelithiasis. Repeat KUB was consistent with improvement of ileus     Discharge Condition:  stable     Discharged to:  Home     Activity:   as tolerated: Follow Up: Follow-up with PCP in 1-2 weeks       Labs:  For convenience and continuity at follow-up the following most recent labs are provided:      CBC: Lab Results   Component Value Date/Time    WBC 3.9 12/07/2022 06:04 AM    HGB 12.8 12/07/2022 06:04 AM    HCT 38.6 12/07/2022 06:04 AM     12/07/2022 06:04 AM       RENAL:   Lab Results   Component Value Date/Time     12/07/2022 06:04 AM    K 3.7 12/07/2022 06:04 AM    K 3.4 12/06/2022 05:56 AM     12/07/2022 06:04 AM    CO2 25 12/07/2022 06:04 AM    BUN 9 12/07/2022 06:04 AM    CREATININE 0.6 12/07/2022 06:04 AM           Discharge Medications:      Medication List        START taking these medications      pantoprazole 40 MG tablet  Commonly known as: PROTONIX  Take 1 tablet by mouth every morning (before breakfast)               Where to Get Your Medications        These medications were sent to [address] [place] [address] & [place] [address] [address] [facility], [address] [facility]      Phone: [phone]   pantoprazole 40 MG tablet            Time Spent on discharge is more than 30 min in the examination, evaluation, counseling and review of medications and discharge plan. Signed:  Bridgette Palacio MD   12/15/2022      Thank you No primary care provider on file. for the opportunity to be involved in this patient's care. If you have any questions or concerns please feel free to contact me at [phone]. This note was transcribed using [id number] [employer]. Please disregard any translational errors.

## 2022-12-19 ENCOUNTER — OFFICE VISIT (OUTPATIENT)
Dept: INTERNAL MEDICINE CLINIC | Age: 62
End: 2022-12-19
Payer: COMMERCIAL

## 2022-12-19 VITALS
TEMPERATURE: 97.7 F | HEART RATE: 92 BPM | BODY MASS INDEX: 30.58 KG/M2 | HEIGHT: 61 IN | SYSTOLIC BLOOD PRESSURE: 137 MMHG | DIASTOLIC BLOOD PRESSURE: 82 MMHG | WEIGHT: 162 LBS | OXYGEN SATURATION: 98 %

## 2022-12-19 DIAGNOSIS — G89.29 CHRONIC RIGHT-SIDED THORACIC BACK PAIN: ICD-10-CM

## 2022-12-19 DIAGNOSIS — Z00.00 ENCOUNTER FOR WELL ADULT EXAM WITHOUT ABNORMAL FINDINGS: Primary | ICD-10-CM

## 2022-12-19 DIAGNOSIS — K21.9 GASTROESOPHAGEAL REFLUX DISEASE, UNSPECIFIED WHETHER ESOPHAGITIS PRESENT: ICD-10-CM

## 2022-12-19 DIAGNOSIS — Z11.4 SCREENING FOR HIV (HUMAN IMMUNODEFICIENCY VIRUS): ICD-10-CM

## 2022-12-19 DIAGNOSIS — M54.6 CHRONIC RIGHT-SIDED THORACIC BACK PAIN: ICD-10-CM

## 2022-12-19 DIAGNOSIS — Z11.59 NEED FOR HEPATITIS C SCREENING TEST: ICD-10-CM

## 2022-12-19 RX ORDER — ACETAMINOPHEN 500 MG
500 TABLET ORAL EVERY 8 HOURS PRN
Qty: 60 TABLET | Refills: 0 | Status: SHIPPED | OUTPATIENT
Start: 2022-12-19 | End: 2023-01-18

## 2022-12-19 RX ORDER — TIZANIDINE 2 MG/1
2 TABLET ORAL NIGHTLY PRN
Qty: 20 TABLET | Refills: 0 | Status: SHIPPED | OUTPATIENT
Start: 2022-12-19

## 2022-12-19 SDOH — ECONOMIC STABILITY: FOOD INSECURITY: WITHIN THE PAST 12 MONTHS, YOU WORRIED THAT YOUR FOOD WOULD RUN OUT BEFORE YOU GOT MONEY TO BUY MORE.: NEVER TRUE

## 2022-12-19 SDOH — ECONOMIC STABILITY: FOOD INSECURITY: WITHIN THE PAST 12 MONTHS, THE FOOD YOU BOUGHT JUST DIDN'T LAST AND YOU DIDN'T HAVE MONEY TO GET MORE.: NEVER TRUE

## 2022-12-19 ASSESSMENT — PATIENT HEALTH QUESTIONNAIRE - PHQ9
SUM OF ALL RESPONSES TO PHQ9 QUESTIONS 1 & 2: 0
SUM OF ALL RESPONSES TO PHQ QUESTIONS 1-9: 0
1. LITTLE INTEREST OR PLEASURE IN DOING THINGS: 0
SUM OF ALL RESPONSES TO PHQ QUESTIONS 1-9: 0
2. FEELING DOWN, DEPRESSED OR HOPELESS: 0

## 2022-12-19 ASSESSMENT — ENCOUNTER SYMPTOMS
BLOOD IN STOOL: 0
ABDOMINAL PAIN: 0
SHORTNESS OF BREATH: 0
SORE THROAT: 0
NAUSEA: 0
COUGH: 0
VOMITING: 0
BACK PAIN: 1

## 2022-12-19 ASSESSMENT — SOCIAL DETERMINANTS OF HEALTH (SDOH): HOW HARD IS IT FOR YOU TO PAY FOR THE VERY BASICS LIKE FOOD, HOUSING, MEDICAL CARE, AND HEATING?: NOT HARD AT ALL

## 2022-12-19 NOTE — PROGRESS NOTES
2022    Wilfrido Ferro (:  1960) is a 63 y.o. female, here for a preventive medicine evaluation. Patient speaks [place], she wises her son Faisal Menendez who speaks English well to interpret for her in [place]. Patient Active Problem List   Diagnosis    Ileus ([facility] [address]       Review of Systems   Constitutional:  Negative for fatigue and fever. HENT:  Negative for nosebleeds and sore throat. Respiratory:  Negative for cough and shortness of breath. Cardiovascular:  Negative for chest pain, palpitations and leg swelling. Gastrointestinal:  Negative for abdominal pain, blood in stool, nausea and vomiting. Musculoskeletal:  Positive for back pain (R upper back). Neurological:  Negative for dizziness and weakness. Prior to Visit Medications    Medication Sig Taking? Authorizing Provider   acetaminophen (TYLENOL) 500 MG tablet Take 1 tablet by mouth every 8 hours as needed for Pain Yes Skylar Bravo MD   tiZANidine (ZANAFLEX) 2 MG tablet Take 1 tablet by mouth nightly as needed (Back pain/ spasm) Yes Skylar Bravo MD   pantoprazole (PROTONIX) 40 MG tablet Take 1 tablet by mouth every morning (before breakfast)  Huber Chapmna MD        No Known Allergies    History reviewed. No pertinent past medical history. History reviewed. No pertinent surgical history.     Social History     Socioeconomic History    Marital status: Single     Spouse name: Not on file    Number of children: Not on file    Years of education: Not on file    Highest education level: Not on file   Occupational History    Not on file   Tobacco Use    Smoking status: Never    Smokeless tobacco: Never   Substance and Sexual Activity    Alcohol use: Never    Drug use: Never    Sexual activity: Not on file   Other Topics Concern    Not on file   Social History Narrative    Not on file     Social Determinants of Health     Financial Resource Strain: Low Risk     Difficulty of Paying Living Expenses: Not hard at all   Food Insecurity: No Food Insecurity    Worried About Running Out of Food in the Last Year: Never true    Ran Out of Food in the Last Year: Never true   Transportation Needs: Not on file   Physical Activity: Not on file   Stress: Not on file   Social Connections: Not on file   Intimate Partner Violence: Not on file   Housing Stability: Not on file        History reviewed. No pertinent family history. ADVANCE DIRECTIVE: N, <no information>    Vitals:    12/19/22 1001   BP: 137/82   Site: Left Upper Arm   Position: Sitting   Cuff Size: Small Adult   Pulse: 92   Temp: 97.7 °F (36.5 °C)   TempSrc: Temporal   SpO2: 98%   Weight: 162 lb (73.5 kg)   Height: 5' 1\" (1.549 m)     Estimated body mass index is 30.61 kg/m² as calculated from the following:    Height as of this encounter: 5' 1\" (1.549 m). Weight as of this encounter: 162 lb (73.5 kg). Physical Exam  Vitals reviewed. Constitutional:       General: She is not in acute distress. Appearance: Normal appearance. HENT:      Head: Normocephalic and atraumatic. Mouth/Throat:      Mouth: Mucous membranes are moist.      Pharynx: Oropharynx is clear. No oropharyngeal exudate or posterior oropharyngeal erythema. Eyes:      General: No scleral icterus. Right eye: No discharge. Left eye: No discharge. Extraocular Movements: Extraocular movements intact. Conjunctiva/sclera: Conjunctivae normal.      Pupils: Pupils are equal, round, and reactive to light. Cardiovascular:      Rate and Rhythm: Normal rate and regular rhythm. Pulses: Normal pulses. Heart sounds: Normal heart sounds. No murmur heard. Pulmonary:      Effort: Pulmonary effort is normal. No respiratory distress. Breath sounds: Normal breath sounds. No stridor. No wheezing. Abdominal:      General: Abdomen is flat. Bowel sounds are normal. There is no distension. Palpations: Abdomen is soft. Tenderness:  There is abdominal tenderness in the epigastric area. There is no guarding or rebound. Musculoskeletal:         General: Normal range of motion. Cervical back: Normal range of motion and neck supple. No tenderness. Thoracic back: Spasms (R side) and tenderness (R side) present. Right lower leg: No edema. Left lower leg: No edema. Skin:     General: Skin is warm. Neurological:      General: No focal deficit present. Mental Status: She is alert and oriented to person, place, and time. Mental status is at baseline. Cranial Nerves: No cranial nerve deficit. Sensory: No sensory deficit. Motor: No weakness. Gait: Gait normal.   Psychiatric:         Mood and Affect: Mood normal.         Behavior: Behavior normal.       No flowsheet data found. Lab Results   Component Value Date/Time    GLUCOSE 99 12/07/2022 06:04 AM       The ASCVD Risk score (Trudi SUBRAMANIAN, et al., 2019) failed to calculate for the following reasons:    Cannot find a previous HDL lab    Cannot find a previous total cholesterol lab    Immunization History   Administered Date(s) Administered    COVID-19, PFIZER PURPLE top, DILUTE for use, (age 15 y+), 30mcg/0.3mL 05/18/2021, 06/08/2021       Health Maintenance   Topic Date Due    HIV screen  Never done    Hepatitis C screen  Never done    DTaP/Tdap/Td vaccine (1 - Tdap) Never done    Cervical cancer screen  Never done    Lipids  Never done    Colorectal Cancer Screen  Never done    Breast cancer screen  Never done    Shingles vaccine (1 of 2) Never done    COVID-19 Vaccine (3 - Booster for Son Peter series) 08/03/2021    Flu vaccine (1) 08/01/2022    Depression Screen  12/19/2023    Hepatitis A vaccine  Aged Out    Hib vaccine  Aged Out    Meningococcal (ACWY) vaccine  Aged Out    Pneumococcal 0-64 years Vaccine  Aged Out       Assessment & Plan   Encounter for well adult exam without abnormal findings  -     Hepatic Function Panel;  Future  -     Hemoglobin A1C; Future  -     Lipid Panel; Future  -     TSH with Reflex; Future    Gastroesophageal reflux disease, unspecified whether esophagitis present  Chronic, improving  Patient takes pantoprazole daily morning empty stomach, reports improvement in heartburn. Has minimal epigastric discomfort on exam.  Continue current dose of pantoprazole. Chronic right-sided thoracic back pain  Uncontrolled, intermittent, pain episodes lasts for several hours  Patient has tenderness on right upper back during physical exam.  Back pain probably due to muscle strain. Prescribed acetaminophen to take as needed for pain and Zanaflex to take at night for back spasm/pain. -     acetaminophen (TYLENOL) 500 MG tablet; Take 1 tablet by mouth every 8 hours as needed for Pain, Disp-60 tablet, R-0Normal  -     tiZANidine (ZANAFLEX) 2 MG tablet; Take 1 tablet by mouth nightly as needed (Back pain/ spasm), Disp-20 tablet, R-0Normal  -Check magnesium level. Screening for HIV (human immunodeficiency virus)  -     HIV Screen; Future  Need for hepatitis C screening test  -     Hepatitis C Antibody; Future    Return in about 4 weeks (around 1/16/2023).          --Bess Sawyer MD

## 2023-09-18 ENCOUNTER — COMMUNITY OUTREACH (OUTPATIENT)
Dept: INTERNAL MEDICINE CLINIC | Age: 63
End: 2023-09-18

## 2024-09-25 ENCOUNTER — TELEPHONE (OUTPATIENT)
Dept: INTERNAL MEDICINE CLINIC | Age: 64
End: 2024-09-25